# Patient Record
Sex: MALE | Race: WHITE
[De-identification: names, ages, dates, MRNs, and addresses within clinical notes are randomized per-mention and may not be internally consistent; named-entity substitution may affect disease eponyms.]

---

## 2019-09-10 ENCOUNTER — HOSPITAL ENCOUNTER (OUTPATIENT)
Dept: HOSPITAL 61 - PCVCIMAG | Age: 79
Discharge: HOME | End: 2019-09-10
Attending: PODIATRIST
Payer: MEDICARE

## 2019-09-10 DIAGNOSIS — I73.9: Primary | ICD-10-CM

## 2019-09-10 DIAGNOSIS — E11.9: ICD-10-CM

## 2019-09-10 DIAGNOSIS — R20.9: ICD-10-CM

## 2019-09-10 PROCEDURE — 93924 LWR XTR VASC STDY BILAT: CPT

## 2019-09-10 NOTE — PCVCIMAG
EXAM: NONINVASIVE ARTERIAL EXAMINATION OF BOTH LOWER EXTREMITIES

INCLUDING PRE AND POST EXERCISE PRESSURE MEASUREMENTS AND DOPPLER

WAVEFORMS



INDICATION: Peripheral Arterial Disease. Leg pain.



FINDINGS: 

Right Brachial: 134 mm Hg.

Right Dorsalis Pedis: 186 mm Hg.

Right Posterior Tibial: 155 mm Hg.

Right ISAEL = 1.39.



Left Brachial: 126 mm Hg.

Left Dorsalis Pedis: 172 mm Hg.

Left Posterior Tibial: 159 mm Hg.

Left ISAEL = 1.28.



Post Exercise:

Right Brachial  156 mm Hg.

Right Dorsalis Pedis:  140 mm Hg.

Left Dorsalis Pedis:  171 mm Hg.

Right ISAEL =  0.90.

Left ISAEL =  1.10.



IMPRESSION: 

No resting ischemia in the right lower extremity.

No exercise induced ischemia in the right lower extremity.

No resting ischemia in the left lower extremity.

No exercise induced ischemia in the left lower extremity.



LOC:[id number]

## 2021-01-11 ENCOUNTER — HOSPITAL ENCOUNTER (EMERGENCY)
Dept: HOSPITAL 35 - ER | Age: 81
Discharge: HOME | End: 2021-01-11
Payer: COMMERCIAL

## 2021-01-11 VITALS — WEIGHT: 240 LBS | HEIGHT: 68 IN | BODY MASS INDEX: 36.37 KG/M2

## 2021-01-11 VITALS — SYSTOLIC BLOOD PRESSURE: 121 MMHG | DIASTOLIC BLOOD PRESSURE: 74 MMHG

## 2021-01-11 DIAGNOSIS — Z88.1: ICD-10-CM
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
## 2021-01-11 DIAGNOSIS — Z79.899: ICD-10-CM

## 2021-01-11 DIAGNOSIS — E11.649: ICD-10-CM

## 2021-01-11 DIAGNOSIS — Z79.4: ICD-10-CM

## 2021-01-11 DIAGNOSIS — Z88.0: ICD-10-CM

## 2021-01-11 DIAGNOSIS — Z86.718: ICD-10-CM

## 2021-01-11 DIAGNOSIS — S00.81XA: Primary | ICD-10-CM
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
## 2021-01-11 DIAGNOSIS — Z79.82: ICD-10-CM

## 2021-01-11 DIAGNOSIS — Z87.891: ICD-10-CM
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
